# Patient Record
Sex: FEMALE | URBAN - METROPOLITAN AREA
[De-identification: names, ages, dates, MRNs, and addresses within clinical notes are randomized per-mention and may not be internally consistent; named-entity substitution may affect disease eponyms.]

---

## 2017-01-10 ENCOUNTER — IMPORTED ENCOUNTER (OUTPATIENT)
Dept: URBAN - METROPOLITAN AREA CLINIC 43 | Facility: CLINIC | Age: 72
End: 2017-01-10

## 2017-01-10 PROBLEM — H25.013: Noted: 2017-01-10

## 2017-01-10 PROBLEM — H25.13: Noted: 2017-01-10

## 2017-03-17 ENCOUNTER — IMPORTED ENCOUNTER (OUTPATIENT)
Dept: URBAN - METROPOLITAN AREA CLINIC 43 | Facility: CLINIC | Age: 72
End: 2017-03-17

## 2017-03-17 PROBLEM — H25.13: Noted: 2017-03-17

## 2017-03-17 PROBLEM — H25.013: Noted: 2017-03-17

## 2017-03-17 PROBLEM — H16.223: Noted: 2017-03-17

## 2017-04-19 ENCOUNTER — IMPORTED ENCOUNTER (OUTPATIENT)
Dept: URBAN - METROPOLITAN AREA CLINIC 43 | Facility: CLINIC | Age: 72
End: 2017-04-19

## 2017-04-19 PROBLEM — Z96.1: Noted: 2017-04-19

## 2017-04-24 ENCOUNTER — IMPORTED ENCOUNTER (OUTPATIENT)
Dept: URBAN - METROPOLITAN AREA CLINIC 43 | Facility: CLINIC | Age: 72
End: 2017-04-24

## 2017-04-24 PROBLEM — H25.12: Noted: 2017-04-24

## 2017-04-24 PROBLEM — H16.223: Noted: 2017-04-24

## 2017-04-24 PROBLEM — Z96.1: Noted: 2017-04-24

## 2017-05-03 ENCOUNTER — IMPORTED ENCOUNTER (OUTPATIENT)
Dept: URBAN - METROPOLITAN AREA CLINIC 43 | Facility: CLINIC | Age: 72
End: 2017-05-03

## 2017-05-03 PROBLEM — Z96.1: Noted: 2017-05-03

## 2017-05-09 ENCOUNTER — IMPORTED ENCOUNTER (OUTPATIENT)
Dept: URBAN - METROPOLITAN AREA CLINIC 43 | Facility: CLINIC | Age: 72
End: 2017-05-09

## 2017-05-09 PROBLEM — Z96.1: Noted: 2017-05-09

## 2017-05-19 ENCOUNTER — IMPORTED ENCOUNTER (OUTPATIENT)
Dept: URBAN - METROPOLITAN AREA CLINIC 43 | Facility: CLINIC | Age: 72
End: 2017-05-19

## 2017-05-19 PROBLEM — H53.19: Noted: 2017-05-19

## 2017-06-05 ENCOUNTER — IMPORTED ENCOUNTER (OUTPATIENT)
Dept: URBAN - METROPOLITAN AREA CLINIC 43 | Facility: CLINIC | Age: 72
End: 2017-06-05

## 2017-06-05 PROBLEM — Z96.1: Noted: 2017-06-05

## 2017-06-05 PROBLEM — H43.813: Noted: 2017-06-05

## 2018-05-31 ENCOUNTER — IMPORTED ENCOUNTER (OUTPATIENT)
Dept: URBAN - METROPOLITAN AREA CLINIC 43 | Facility: CLINIC | Age: 73
End: 2018-05-31

## 2018-05-31 ENCOUNTER — PREPPED CHART (OUTPATIENT)
Dept: URBAN - METROPOLITAN AREA CLINIC 32 | Facility: CLINIC | Age: 73
End: 2018-05-31

## 2018-05-31 PROBLEM — H26.491: Noted: 2018-05-31

## 2018-05-31 PROBLEM — H35.371: Noted: 2018-05-31

## 2018-05-31 PROBLEM — H43.813: Noted: 2018-05-31

## 2018-05-31 PROBLEM — H16.223: Noted: 2018-05-31

## 2018-06-15 ENCOUNTER — APPOINTMENT (RX ONLY)
Dept: URBAN - METROPOLITAN AREA CLINIC 126 | Facility: CLINIC | Age: 73
Setting detail: DERMATOLOGY
End: 2018-06-15

## 2018-06-15 DIAGNOSIS — L81.4 OTHER MELANIN HYPERPIGMENTATION: ICD-10-CM

## 2018-06-15 DIAGNOSIS — L82.1 OTHER SEBORRHEIC KERATOSIS: ICD-10-CM

## 2018-06-15 DIAGNOSIS — D18.0 HEMANGIOMA: ICD-10-CM

## 2018-06-15 DIAGNOSIS — Z71.89 OTHER SPECIFIED COUNSELING: ICD-10-CM

## 2018-06-15 DIAGNOSIS — L57.0 ACTINIC KERATOSIS: ICD-10-CM

## 2018-06-15 DIAGNOSIS — L82.0 INFLAMED SEBORRHEIC KERATOSIS: ICD-10-CM

## 2018-06-15 DIAGNOSIS — D22 MELANOCYTIC NEVI: ICD-10-CM

## 2018-06-15 PROBLEM — D18.01 HEMANGIOMA OF SKIN AND SUBCUTANEOUS TISSUE: Status: ACTIVE | Noted: 2018-06-15

## 2018-06-15 PROBLEM — D22.5 MELANOCYTIC NEVI OF TRUNK: Status: ACTIVE | Noted: 2018-06-15

## 2018-06-15 PROCEDURE — 17000 DESTRUCT PREMALG LESION: CPT | Mod: 59

## 2018-06-15 PROCEDURE — ? LIQUID NITROGEN

## 2018-06-15 PROCEDURE — ? COUNSELING

## 2018-06-15 PROCEDURE — 99214 OFFICE O/P EST MOD 30 MIN: CPT | Mod: 25

## 2018-06-15 PROCEDURE — ? BENIGN DESTRUCTION

## 2018-06-15 PROCEDURE — 17003 DESTRUCT PREMALG LES 2-14: CPT

## 2018-06-15 PROCEDURE — 17110 DESTRUCTION B9 LES UP TO 14: CPT

## 2018-06-15 ASSESSMENT — LOCATION ZONE DERM
LOCATION ZONE: FACE
LOCATION ZONE: ARM
LOCATION ZONE: LEG
LOCATION ZONE: NECK
LOCATION ZONE: TRUNK

## 2018-06-15 ASSESSMENT — LOCATION SIMPLE DESCRIPTION DERM
LOCATION SIMPLE: LEFT PRETIBIAL REGION
LOCATION SIMPLE: RIGHT PRETIBIAL REGION
LOCATION SIMPLE: RIGHT UPPER BACK
LOCATION SIMPLE: LEFT FOREHEAD
LOCATION SIMPLE: LEFT CLAVICULAR SKIN
LOCATION SIMPLE: RIGHT CHEEK
LOCATION SIMPLE: LEFT POSTERIOR UPPER ARM
LOCATION SIMPLE: UPPER BACK
LOCATION SIMPLE: LEFT ANTERIOR NECK
LOCATION SIMPLE: RIGHT POSTERIOR UPPER ARM
LOCATION SIMPLE: ABDOMEN

## 2018-06-15 ASSESSMENT — LOCATION DETAILED DESCRIPTION DERM
LOCATION DETAILED: RIGHT PROXIMAL PRETIBIAL REGION
LOCATION DETAILED: LEFT CLAVICULAR NECK
LOCATION DETAILED: EPIGASTRIC SKIN
LOCATION DETAILED: RIGHT SUPERIOR MEDIAL MALAR CHEEK
LOCATION DETAILED: LEFT INFERIOR ANTERIOR NECK
LOCATION DETAILED: RIGHT PROXIMAL POSTERIOR UPPER ARM
LOCATION DETAILED: LEFT INFERIOR LATERAL NECK
LOCATION DETAILED: RIGHT MEDIAL UPPER BACK
LOCATION DETAILED: SUBXIPHOID
LOCATION DETAILED: SUPERIOR THORACIC SPINE
LOCATION DETAILED: LEFT INFERIOR FOREHEAD
LOCATION DETAILED: INFERIOR THORACIC SPINE
LOCATION DETAILED: LEFT PROXIMAL POSTERIOR UPPER ARM
LOCATION DETAILED: LEFT DISTAL PRETIBIAL REGION
LOCATION DETAILED: LEFT CLAVICULAR SKIN
LOCATION DETAILED: PERIUMBILICAL SKIN

## 2018-06-15 NOTE — PROCEDURE: BENIGN DESTRUCTION
Medical Necessity Clause: This procedure was medically necessary because the lesions that were treated were:
Consent: The patient's consent was obtained including but not limited to risks of crusting, scabbing, blistering, scarring, darker or lighter pigmentary change, recurrence, incomplete removal and infection.
Include Z78.9 (Other Specified Conditions Influencing Health Status) As An Associated Diagnosis?: No
Treatment Number (Will Not Render If 0): 0
Medical Necessity Information: It is in your best interest to select a reason for this procedure from the list below. All of these items fulfill various CMS LCD requirements except the new and changing color options.
Anesthesia Volume In Cc: 0.5
Post-Care Instructions: I reviewed with the patient in detail post-care instructions. Patient is to wear sunprotection, and avoid picking at any of the treated lesions. Pt may apply Vaseline to crusted or scabbing areas.
Render Post-Care Instructions In Note?: yes
Detail Level: Simple

## 2019-12-11 ENCOUNTER — ESTABLISHED COMPREHENSIVE EXAM (OUTPATIENT)
Dept: URBAN - METROPOLITAN AREA CLINIC 32 | Facility: CLINIC | Age: 74
End: 2019-12-11

## 2019-12-11 DIAGNOSIS — H26.491: ICD-10-CM

## 2019-12-11 PROCEDURE — G8428 CUR MEDS NOT DOCUMENT: HCPCS

## 2019-12-11 PROCEDURE — G8785 BP SCRN NO PERF AT INTERVAL: HCPCS

## 2019-12-11 PROCEDURE — G8482 FLU IMMUNIZE ORDER/ADMIN: HCPCS

## 2019-12-11 PROCEDURE — 1036F TOBACCO NON-USER: CPT

## 2019-12-11 PROCEDURE — G9903 PT SCRN TBCO ID AS NON USER: HCPCS

## 2019-12-11 PROCEDURE — 4040F PNEUMOC VAC/ADMIN/RCVD: CPT

## 2019-12-11 PROCEDURE — 92014 COMPRE OPH EXAM EST PT 1/>: CPT

## 2019-12-11 PROCEDURE — 92015 DETERMINE REFRACTIVE STATE: CPT

## 2019-12-11 ASSESSMENT — KERATOMETRY
OD_AXISANGLE2_DEGREES: 70
OD_AXISANGLE_DEGREES: 160
OS_K2POWER_DIOPTERS: 44.25
OS_AXISANGLE_DEGREES: 45
OD_K1POWER_DIOPTERS: 44.5
OS_K1POWER_DIOPTERS: 43.5
OS_AXISANGLE2_DEGREES: 135
OD_K2POWER_DIOPTERS: 45.25

## 2019-12-11 ASSESSMENT — VISUAL ACUITY
OS_SC: J3
OD_SC: J3
OS_SC: 20/30
OS_CC: J1
OD_SC: 20/50
OS_CC: 20/30
OD_CC: J1
OD_CC: 20/30

## 2019-12-11 ASSESSMENT — TONOMETRY
OS_IOP_MMHG: 12
OD_IOP_MMHG: 12

## 2020-04-19 ASSESSMENT — VISUAL ACUITY
OD_SC: 20/30-
OD_SC: 20/20-
OD_CC: J1
OD_CC: 20/20-1
OD_CC: 20/20
OS_SC: 20/40 -2
OD_SC: 20/50
OS_CC: 20/30 -2
OS_CC: 20/20
OS_OTHER: <400.
OD_SC: 20/30 -2
OS_SC: 20/20-
OS_CC: J1
OS_CC: 20/30
OD_SC: J10
OD_SC: 20/40 +1
OS_SC: 20/25 -2
OS_SC: 20/25-
OD_SC: 20/40 +2
OD_SC: 20/50 -2
OS_CC: 20/25
OD_SC: 20/25-
OD_CC: J1+
OD_PH: 20/25 -
OS_CC: 20/20-2
OD_CC: 20/30 -2
OD_CC: 20/25
OS_SC: 20/40 +2
OS_SC: J16
OD_CC: 20/30
OS_CC: J1
OS_SC: 20/40
OS_SC: 20/40
OS_SC: 20/25-2
OS_SC: 20/40
OD_PH: 20/30
OD_SC: 20/50
OD_SC: 20/50
OD_OTHER: <400.
OS_OTHER: <400.
OD_CC: J1
OD_OTHER: <400.
OD_OTHER: 20/40.
OS_OTHER: 20/40.
OS_SC: 20/40
OS_PH: 20/30
OD_SC: 20/30 +1
OS_SC: 20/30-1
OS_CC: J1+
OS_CC: J1+
OD_CC: J1

## 2020-04-19 ASSESSMENT — TONOMETRY
OD_IOP_MMHG: 11.0
OS_IOP_MMHG: 17.0
OS_IOP_MMHG: 13.0
OD_IOP_MMHG: 14.0
OD_IOP_MMHG: 14.0
OD_IOP_MMHG: 21.0
OD_IOP_MMHG: 11.0
OS_IOP_MMHG: 17.0
OS_IOP_MMHG: 11.0
OD_IOP_MMHG: 14.0
OD_IOP_MMHG: 10.0
OD_IOP_MMHG: 15.0
OS_IOP_MMHG: 13.0
OS_IOP_MMHG: 14.0
OD_IOP_MMHG: 17.0
OD_IOP_MMHG: 13.0
OS_IOP_MMHG: 12.0
OS_IOP_MMHG: 15.0

## 2020-04-19 ASSESSMENT — KERATOMETRY
OD_AXISANGLE2_DEGREES: 36
OS_AXISANGLE_DEGREES: 25
OS_K1POWER_DIOPTERS: 44.25
OD_AXISANGLE2_DEGREES: 55
OD_AXISANGLE_DEGREES: 140
OS_K1POWER_DIOPTERS: 44.25
OS_K2POWER_DIOPTERS: 43.75
OD_AXISANGLE2_DEGREES: 60
OD_K2POWER_DIOPTERS: 44.25
OS_AXISANGLE_DEGREES: 30
OD_AXISANGLE2_DEGREES: 50
OD_K1POWER_DIOPTERS: 45
OD_K2POWER_DIOPTERS: 44.25
OS_K1POWER_DIOPTERS: 44.25
OS_AXISANGLE2_DEGREES: 115
OS_K2POWER_DIOPTERS: 43.5
OD_AXISANGLE_DEGREES: 140
OD_K1POWER_DIOPTERS: 45
OS_AXISANGLE_DEGREES: 25
OD_K2POWER_DIOPTERS: 44.25
OS_AXISANGLE2_DEGREES: 120
OS_AXISANGLE2_DEGREES: 129
OS_AXISANGLE_DEGREES: 15
OS_AXISANGLE_DEGREES: 39
OD_K1POWER_DIOPTERS: 45
OS_K1POWER_DIOPTERS: 44.5
OD_AXISANGLE_DEGREES: 145
OD_AXISANGLE_DEGREES: 150
OS_K2POWER_DIOPTERS: 43.5
OS_K1POWER_DIOPTERS: 44.5
OS_K2POWER_DIOPTERS: 43.75
OS_K2POWER_DIOPTERS: 43.75
OD_K2POWER_DIOPTERS: 44.25
OD_AXISANGLE_DEGREES: 126
OD_K1POWER_DIOPTERS: 45
OD_K2POWER_DIOPTERS: 44.25
OD_AXISANGLE2_DEGREES: 50
OS_AXISANGLE2_DEGREES: 115
OD_K1POWER_DIOPTERS: 45
OS_AXISANGLE2_DEGREES: 105

## 2021-11-29 ENCOUNTER — ESTABLISHED COMPREHENSIVE EXAM (OUTPATIENT)
Dept: URBAN - METROPOLITAN AREA CLINIC 32 | Facility: CLINIC | Age: 76
End: 2021-11-29

## 2021-11-29 DIAGNOSIS — H04.123: ICD-10-CM

## 2021-11-29 PROCEDURE — 92015 DETERMINE REFRACTIVE STATE: CPT

## 2021-11-29 PROCEDURE — 92014 COMPRE OPH EXAM EST PT 1/>: CPT

## 2021-11-29 ASSESSMENT — TONOMETRY
OS_IOP_MMHG: 12
OD_IOP_MMHG: 11

## 2021-11-29 ASSESSMENT — VISUAL ACUITY
OD_CC: 20/25-3
OS_CC: J1+
OS_CC: 20/25-2
OD_CC: J1+

## 2021-11-29 ASSESSMENT — KERATOMETRY
OD_AXISANGLE2_DEGREES: 70
OD_AXISANGLE_DEGREES: 160
OS_K2POWER_DIOPTERS: 44.25
OS_AXISANGLE2_DEGREES: 135
OS_AXISANGLE_DEGREES: 45
OD_K2POWER_DIOPTERS: 45.25
OD_K1POWER_DIOPTERS: 44.5
OS_K1POWER_DIOPTERS: 43.5

## 2023-06-12 ENCOUNTER — EMERGENCY VISIT (OUTPATIENT)
Dept: URBAN - METROPOLITAN AREA CLINIC 32 | Facility: CLINIC | Age: 78
End: 2023-06-12

## 2023-06-12 DIAGNOSIS — S00.10XA: ICD-10-CM

## 2023-06-12 DIAGNOSIS — H10.45: ICD-10-CM

## 2023-06-12 PROCEDURE — 92012 INTRM OPH EXAM EST PATIENT: CPT

## 2023-06-12 ASSESSMENT — KERATOMETRY
OS_K1POWER_DIOPTERS: 44.50
OS_AXISANGLE2_DEGREES: 31
OD_K2POWER_DIOPTERS: 44.50
OS_K2POWER_DIOPTERS: 44.25
OD_AXISANGLE_DEGREES: 61
OD_AXISANGLE2_DEGREES: 151
OD_K1POWER_DIOPTERS: 45.25
OS_AXISANGLE_DEGREES: 121

## 2023-06-12 ASSESSMENT — VISUAL ACUITY
OD_CC: 20/30+2
OS_CC: 20/20-1

## 2023-06-12 ASSESSMENT — TONOMETRY
OD_IOP_MMHG: 13
OS_IOP_MMHG: 19

## 2024-11-01 ENCOUNTER — COMPREHENSIVE EXAM (OUTPATIENT)
Dept: URBAN - METROPOLITAN AREA CLINIC 32 | Facility: CLINIC | Age: 79
End: 2024-11-01

## 2024-11-01 DIAGNOSIS — H10.45: ICD-10-CM

## 2024-11-01 DIAGNOSIS — H35.373: ICD-10-CM

## 2024-11-01 DIAGNOSIS — H04.123: ICD-10-CM

## 2024-11-01 DIAGNOSIS — H02.882: ICD-10-CM

## 2024-11-01 DIAGNOSIS — H02.885: ICD-10-CM

## 2024-11-01 PROCEDURE — 99214 OFFICE O/P EST MOD 30 MIN: CPT

## 2024-11-01 PROCEDURE — 92015 DETERMINE REFRACTIVE STATE: CPT

## 2024-11-01 PROCEDURE — 92134 CPTRZ OPH DX IMG PST SGM RTA: CPT
